# Patient Record
Sex: FEMALE | Race: WHITE | NOT HISPANIC OR LATINO | Employment: FULL TIME | URBAN - METROPOLITAN AREA
[De-identification: names, ages, dates, MRNs, and addresses within clinical notes are randomized per-mention and may not be internally consistent; named-entity substitution may affect disease eponyms.]

---

## 2018-09-12 ENCOUNTER — OFFICE VISIT (OUTPATIENT)
Dept: ENDOCRINOLOGY | Facility: HOSPITAL | Age: 43
End: 2018-09-12
Payer: COMMERCIAL

## 2018-09-12 ENCOUNTER — TELEPHONE (OUTPATIENT)
Dept: ENDOCRINOLOGY | Facility: HOSPITAL | Age: 43
End: 2018-09-12

## 2018-09-12 VITALS
HEART RATE: 128 BPM | WEIGHT: 186.2 LBS | DIASTOLIC BLOOD PRESSURE: 100 MMHG | HEIGHT: 67 IN | SYSTOLIC BLOOD PRESSURE: 160 MMHG | BODY MASS INDEX: 29.22 KG/M2

## 2018-09-12 DIAGNOSIS — E04.2 MULTIPLE THYROID NODULES: Primary | ICD-10-CM

## 2018-09-12 PROCEDURE — 99244 OFF/OP CNSLTJ NEW/EST MOD 40: CPT | Performed by: INTERNAL MEDICINE

## 2018-09-12 RX ORDER — TRIAMTERENE AND HYDROCHLOROTHIAZIDE 37.5; 25 MG/1; MG/1
1 CAPSULE ORAL DAILY
Refills: 1 | COMMUNITY
Start: 2018-08-16

## 2018-09-12 RX ORDER — CETIRIZINE HYDROCHLORIDE 10 MG/1
10 TABLET ORAL DAILY
COMMUNITY

## 2018-09-12 NOTE — LETTER
September 12, 2018     Connie Andrade, [address] [facility] [id number] [facility]    Patient: Beatris Noriega   YOB: 1975   Date of Visit: 9/12/2018       Dear Dr Ofelia Aguila:    Thank you for referring Beatris Noriega to me for evaluation  Below are my notes for this consultation  If you have questions, please do not hesitate to call me  I look forward to following your patient along with you  Sincerely,        Martina Garcia DO        CC: No Recipients  Martina Garcia DO  9/12/2018 11:28 AM  Sign at close encounter  9/12/2018    Assessment/Plan      Diagnoses and all orders for this visit:    Multiple thyroid nodules  -     US thyroid; Future    Other orders  -     triamterene-hydrochlorothiazide (DYAZIDE) 37 5-25 mg per capsule; Take 1 capsule by mouth daily  -     cetirizine (ZyrTEC) 10 mg tablet; Take 10 mg by mouth daily        Assessment/Plan:  51-year-old female presents for evaluation of thyroid nodules  There is a new nodule in the right lobe as well as a nodule in the left lobe that increased in size  Currently a still do not meet biopsy criteria  I think it would be reasonable to repeat an ultrasound at a sooner time interval   I have ordered a thyroid ultrasound for comparison to be done in 6 months and we will see her in the office to discuss the results  Follow-up in 6 months  I will also acquire thyroid function study blood work done at her physical back in July through her PCP  CC:   Thyroid nodules    History of Present Illness     HPI: Beatris Noriega is a 41y o  year old female with history of thyroid nodules for several years, hypertension, allergies who presents for consultation  She has had her thyroid nodules monitor by ultrasound about every year so  Most recent ultrasound was done in July of 2018 that shows a new nodule and nodule that increase in size  Therefore she is referred here for evaluation  She denies any family history of thyroid cancer    She states her mother has hypothyroidism  She denies any history of radiation to her head or neck area  She denies any neck compressive symptoms  Overall she feels well  Her blood pressure and pulse are elevated today but this is more related to being at a new doctor's office and anxious  She states she checks her blood pressure at the pharmacy periodically and it is better  She denies any symptoms of hypothyroidism or hyperthyroidism  Review of Systems   Constitutional: Negative for chills, fatigue and fever  HENT: Negative for trouble swallowing and voice change  Eyes: Negative for visual disturbance  Respiratory: Negative for shortness of breath  Cardiovascular: Negative for palpitations and leg swelling  Gastrointestinal: Negative for abdominal pain, nausea and vomiting  Endocrine: Negative for cold intolerance, heat intolerance, polydipsia and polyuria  Musculoskeletal: Negative for arthralgias and myalgias  Skin: Negative for rash  Neurological: Negative for dizziness, tremors and weakness  Hematological: Negative for adenopathy  Psychiatric/Behavioral: Negative for agitation and confusion  Historical Information   No past medical history on file  No past surgical history on file  Social History   History   Alcohol use Not on file     History   Drug use: Unknown     History   Smoking Status    Never Smoker   Smokeless Tobacco    Never Used     Family History:   Family History   Problem Relation Age of Onset    Hypothyroidism Mother     Heart attack Father     No Known Problems Sister        Meds/Allergies   Current Outpatient Prescriptions   Medication Sig Dispense Refill    cetirizine (ZyrTEC) 10 mg tablet Take 10 mg by mouth daily      triamterene-hydrochlorothiazide (DYAZIDE) 37 5-25 mg per capsule Take 1 capsule by mouth daily  1     No current facility-administered medications for this visit        No Known Allergies    Objective   Vitals: Blood pressure 160/100, pulse (!) 128, height 5' 7" (1 702 m), weight 84 5 kg (186 lb 3 2 oz)  Invasive Devices          No matching active lines, drains, or airways          Physical Exam   Constitutional: She is oriented to person, place, and time  She appears well-developed and well-nourished  No distress  HENT:   Head: Normocephalic and atraumatic  Eyes: Conjunctivae are normal  Pupils are equal, round, and reactive to light  Neck: Normal range of motion  Neck supple  No thyromegaly present  Cardiovascular: Normal rate and regular rhythm  Pulmonary/Chest: Effort normal and breath sounds normal  No respiratory distress  Abdominal: Soft  Bowel sounds are normal  She exhibits no distension  Musculoskeletal: Normal range of motion  She exhibits no edema  Lymphadenopathy:     She has no cervical adenopathy  Neurological: She is alert and oriented to person, place, and time  She exhibits normal muscle tone  Skin: Skin is warm and dry  No rash noted  She is not diaphoretic  Psychiatric: She has a normal mood and affect  Her behavior is normal    Vitals reviewed  The history was obtained from the review of the chart and from the patient  Lab Results:      Thyroid ultrasound from 07/05/2018 done at [facility]:  In the right lobe there is a 0 8 x 0 7 x 0 5 cm new nodule that is nearly isoechoic at the lower pole of the right lobe  In the left lobe at the lower pole there is a 0 9 x 0 9 x 0 5 cm previously 0 5 x 0 4 x 0 5 cm nodule that is nearly isoechoic with a hypoechoic rim  No future appointments

## 2018-09-12 NOTE — TELEPHONE ENCOUNTER
Spencer Bailey had thyroid blood work done through her PCP back in July  Can we acquire these for our records? Thanks

## 2018-09-12 NOTE — TELEPHONE ENCOUNTER
Called Dr Kimberli Mehta office/ spoke to Dg/ he said only thyroid labs they have were done in May/ they will fax over

## 2018-09-12 NOTE — PROGRESS NOTES
9/12/2018    Assessment/Plan      Diagnoses and all orders for this visit:    Multiple thyroid nodules  -     US thyroid; Future    Other orders  -     triamterene-hydrochlorothiazide (DYAZIDE) 37 5-25 mg per capsule; Take 1 capsule by mouth daily  -     cetirizine (ZyrTEC) 10 mg tablet; Take 10 mg by mouth daily        Assessment/Plan:  22-year-old female presents for evaluation of thyroid nodules  There is a new nodule in the right lobe as well as a nodule in the left lobe that increased in size  Currently a still do not meet biopsy criteria  I think it would be reasonable to repeat an ultrasound at a sooner time interval   I have ordered a thyroid ultrasound for comparison to be done in 6 months and we will see her in the office to discuss the results  Follow-up in 6 months  I will also acquire thyroid function study blood work done at her physical back in July through her PCP  Addendum:  Labs from [address] [place] [address] on 05/16/2018 show a TSH of 2 8  CC:   Thyroid nodules    History of Present Illness     HPI: Korin Dubose is a 41y o  year old female with history of thyroid nodules for several years, hypertension, allergies who presents for consultation  She has had her thyroid nodules monitor by ultrasound about every year so  Most recent ultrasound was done in July of 2018 that shows a new nodule and nodule that increase in size  Therefore she is referred here for evaluation  She denies any family history of thyroid cancer  She states her mother has hypothyroidism  She denies any history of radiation to her head or neck area  She denies any neck compressive symptoms  Overall she feels well  Her blood pressure and pulse are elevated today but this is more related to being at a new doctor's office and anxious  She states she checks her blood pressure at the pharmacy periodically and it is better  She denies any symptoms of hypothyroidism or hyperthyroidism      Review of Systems Constitutional: Negative for chills, fatigue and fever  HENT: Negative for trouble swallowing and voice change  Eyes: Negative for visual disturbance  Respiratory: Negative for shortness of breath  Cardiovascular: Negative for palpitations and leg swelling  Gastrointestinal: Negative for abdominal pain, nausea and vomiting  Endocrine: Negative for cold intolerance, heat intolerance, polydipsia and polyuria  Musculoskeletal: Negative for arthralgias and myalgias  Skin: Negative for rash  Neurological: Negative for dizziness, tremors and weakness  Hematological: Negative for adenopathy  Psychiatric/Behavioral: Negative for agitation and confusion  Historical Information   No past medical history on file  No past surgical history on file  Social History   History   Alcohol use Not on file     History   Drug use: Unknown     History   Smoking Status    Never Smoker   Smokeless Tobacco    Never Used     Family History:   Family History   Problem Relation Age of Onset    Hypothyroidism Mother     Heart attack Father     No Known Problems Sister        Meds/Allergies   Current Outpatient Prescriptions   Medication Sig Dispense Refill    cetirizine (ZyrTEC) 10 mg tablet Take 10 mg by mouth daily      triamterene-hydrochlorothiazide (DYAZIDE) 37 5-25 mg per capsule Take 1 capsule by mouth daily  1     No current facility-administered medications for this visit  No Known Allergies    Objective   Vitals: Blood pressure 160/100, pulse (!) 128, height 5' 7" (1 702 m), weight 84 5 kg (186 lb 3 2 oz)  Invasive Devices          No matching active lines, drains, or airways          Physical Exam   Constitutional: She is oriented to person, place, and time  She appears well-developed and well-nourished  No distress  HENT:   Head: Normocephalic and atraumatic  Eyes: Conjunctivae are normal  Pupils are equal, round, and reactive to light  Neck: Normal range of motion  Neck supple   No thyromegaly present  Cardiovascular: Normal rate and regular rhythm  Pulmonary/Chest: Effort normal and breath sounds normal  No respiratory distress  Abdominal: Soft  Bowel sounds are normal  She exhibits no distension  Musculoskeletal: Normal range of motion  She exhibits no edema  Lymphadenopathy:     She has no cervical adenopathy  Neurological: She is alert and oriented to person, place, and time  She exhibits normal muscle tone  Skin: Skin is warm and dry  No rash noted  She is not diaphoretic  Psychiatric: She has a normal mood and affect  Her behavior is normal    Vitals reviewed  The history was obtained from the review of the chart and from the patient  Lab Results:      Thyroid ultrasound from 07/05/2018 done at [facility]:  In the right lobe there is a 0 8 x 0 7 x 0 5 cm new nodule that is nearly isoechoic at the lower pole of the right lobe  In the left lobe at the lower pole there is a 0 9 x 0 9 x 0 5 cm previously 0 5 x 0 4 x 0 5 cm nodule that is nearly isoechoic with a hypoechoic rim  No future appointments

## 2019-03-21 ENCOUNTER — OFFICE VISIT (OUTPATIENT)
Dept: ENDOCRINOLOGY | Facility: HOSPITAL | Age: 44
End: 2019-03-21
Payer: COMMERCIAL

## 2019-03-21 VITALS
HEIGHT: 67 IN | SYSTOLIC BLOOD PRESSURE: 140 MMHG | WEIGHT: 186.2 LBS | HEART RATE: 108 BPM | DIASTOLIC BLOOD PRESSURE: 100 MMHG | BODY MASS INDEX: 29.22 KG/M2

## 2019-03-21 DIAGNOSIS — E04.2 MULTIPLE THYROID NODULES: Primary | ICD-10-CM

## 2019-03-21 PROCEDURE — 99214 OFFICE O/P EST MOD 30 MIN: CPT | Performed by: INTERNAL MEDICINE

## 2019-03-21 NOTE — PROGRESS NOTES
3/21/2019    Assessment/Plan      Diagnoses and all orders for this visit:    Multiple thyroid nodules  -     US thyroid; Future  -     TSH, 3rd generation Lab Collect; Future        Assessment/Plan:  82-year-old female presents for follow-up of thyroid nodules  Most recent ultrasound shows stability of left-sided nodule  I suggested I see her back in 1 year with a TSH level and thyroid ultrasound just prior  We she can call sooner with any questions or concerns  CC: Follow up    History of Present Illness     HPI: Jono Lerma is a 41y o  year old female with history of thyroid nodules for several years who presents for follow-up appointment  No history of personal head or neck radiation  No family history of thyroid cancer  TSH from May was normal   Overall she is feeling well  No symptoms of hypothyroidism or hyperthyroidism  No neck compressive symptoms  No other new health issues or symptoms of concern  Review of Systems   Constitutional: Negative for fatigue  HENT: Negative for trouble swallowing and voice change  Eyes: Negative for visual disturbance  Respiratory: Negative for shortness of breath  Cardiovascular: Negative for palpitations and leg swelling  Gastrointestinal: Negative for abdominal pain, nausea and vomiting  Endocrine: Negative for polydipsia and polyuria  Musculoskeletal: Negative for arthralgias and myalgias  Skin: Negative for rash  Neurological: Negative for dizziness, tremors and weakness  Hematological: Negative for adenopathy  Psychiatric/Behavioral: Negative for agitation and confusion  Historical Information   History reviewed  No pertinent past medical history  History reviewed  No pertinent surgical history    Social History   Social History     Substance and Sexual Activity   Alcohol Use Not on file     Social History     Substance and Sexual Activity   Drug Use Not on file     Social History     Tobacco Use   Smoking Status Never Smoker   Smokeless Tobacco Never Used     Family History:   Family History   Problem Relation Age of Onset    Hypothyroidism Mother     Heart attack Father     No Known Problems Sister        Meds/Allergies   Current Outpatient Medications   Medication Sig Dispense Refill    cetirizine (ZyrTEC) 10 mg tablet Take 10 mg by mouth daily      triamterene-hydrochlorothiazide (DYAZIDE) 37 5-25 mg per capsule Take 1 capsule by mouth daily  1     No current facility-administered medications for this visit  No Known Allergies    Objective   Vitals: Blood pressure 140/100, pulse (!) 108, height 5' 7" (1 702 m), weight 84 5 kg (186 lb 3 2 oz)  Invasive Devices          None          Physical Exam   Constitutional: She is oriented to person, place, and time  She appears well-developed and well-nourished  No distress  HENT:   Head: Normocephalic and atraumatic  Neck: Normal range of motion  Neck supple  No thyromegaly present  Cardiovascular: Normal rate and regular rhythm  Pulmonary/Chest: Effort normal and breath sounds normal    Abdominal: Soft  Bowel sounds are normal    Musculoskeletal: Normal range of motion  She exhibits no edema  Neurological: She is alert and oriented to person, place, and time  She exhibits normal muscle tone  Skin: Skin is warm and dry  No rash noted  She is not diaphoretic  Psychiatric: She has a normal mood and affect  Her behavior is normal    Vitals reviewed  The history was obtained from the review of the chart and from the patient  Lab Results:      Ultrasound of the thyroid from [address] [place] [address] on 03/12/2019: In the right lobe there is no discrete nodule  The previously suggested subtle lower pole nodule in the right lobe is not seen on current study  In the left lobe is a hypoechoic solid nodule in the lower pole measuring 1 x 0 6 x 0 9 cm previously 9 x 5 x 9 mm  No future appointments      Portions of the record may have been created with voice recognition software  Occasional wrong word or "sound a like" substitutions may have occurred due to the inherent limitations of voice recognition software  Read the chart carefully and recognize, using context, where substitutions have occurred

## 2020-03-14 LAB — TSH SERPL-ACNC: 2.77 MIU/L

## 2020-03-17 ENCOUNTER — TELEPHONE (OUTPATIENT)
Dept: ENDOCRINOLOGY | Facility: HOSPITAL | Age: 45
End: 2020-03-17

## 2020-03-17 NOTE — TELEPHONE ENCOUNTER
She did the blood and results are in Epic  Please advise  Does she still need labs in a couple of months when she reschedules?

## 2020-03-17 NOTE — TELEPHONE ENCOUNTER
Pt has an appointment for a yearly f/u with you on 3/23  With the [address] going on pt wants to know if she has to come in and if you're ok with her rescheduling?

## 2020-03-17 NOTE — TELEPHONE ENCOUNTER
She can reschedule  She can have her TSH blood test and thyroid ultrasound done closer to when she reschedules such as in 3-4 months  I may need to reorder these

## 2020-05-18 ENCOUNTER — TELEMEDICINE (OUTPATIENT)
Dept: ENDOCRINOLOGY | Facility: HOSPITAL | Age: 45
End: 2020-05-18
Payer: COMMERCIAL

## 2020-05-18 DIAGNOSIS — E04.2 MULTIPLE THYROID NODULES: Primary | ICD-10-CM

## 2020-05-18 PROCEDURE — 99242 OFF/OP CONSLTJ NEW/EST SF 20: CPT | Performed by: INTERNAL MEDICINE

## 2020-09-25 ENCOUNTER — OFFICE VISIT (OUTPATIENT)
Dept: ENDOCRINOLOGY | Facility: HOSPITAL | Age: 45
End: 2020-09-25
Payer: COMMERCIAL

## 2020-09-25 VITALS
DIASTOLIC BLOOD PRESSURE: 92 MMHG | HEART RATE: 112 BPM | TEMPERATURE: 97.8 F | BODY MASS INDEX: 31.21 KG/M2 | HEIGHT: 66 IN | WEIGHT: 194.2 LBS | SYSTOLIC BLOOD PRESSURE: 158 MMHG

## 2020-09-25 DIAGNOSIS — E04.1 THYROID NODULE: Primary | ICD-10-CM

## 2020-09-25 PROCEDURE — 99213 OFFICE O/P EST LOW 20 MIN: CPT | Performed by: INTERNAL MEDICINE

## 2020-09-25 NOTE — PROGRESS NOTES
9/25/2020    Assessment/Plan      Diagnoses and all orders for this visit:    Thyroid nodule  -     TSH, 3rd generation Lab Collect; Future  -     US thyroid; Future        Assessment/Plan:  Thyroid nodule:  TSH from March was normal   The left-sided thyroid nodule has gradually increased in size though it is still on the small side and appears low risk based on its isoechoic description  Discussed with the patient that we can monitor closely and I would repeat an ultrasound in 6 months to monitor closely for any additional growth  At that time she will be due for another TSH level  We will see her in the office at that time and reviewed the blood work and ultrasound  CC:  Follow-up    History of Present Illness     HPI: Monisha Lea is a 39y o  year old female with history of thyroid nodules for several years who presents for a follow-up appointment  No family history of thyroid cancer and no personal history of head or neck radiation  No neck compressive symptoms  She presents today overall feeling well  No new health issues or symptoms of concern  No neck compressive symptoms  Review of Systems   Constitutional: Negative for fatigue  HENT: Negative for trouble swallowing and voice change  Eyes: Negative for visual disturbance  Respiratory: Negative for shortness of breath  Cardiovascular: Negative for palpitations and leg swelling  Gastrointestinal: Negative for abdominal pain, nausea and vomiting  Endocrine: Negative for polydipsia and polyuria  Musculoskeletal: Negative for arthralgias and myalgias  Skin: Negative for rash  Neurological: Negative for dizziness, tremors and weakness  Hematological: Negative for adenopathy  Psychiatric/Behavioral: Negative for agitation and confusion  Historical Information   History reviewed  No pertinent past medical history  History reviewed  No pertinent surgical history    Social History   Social History     Substance and Sexual Activity   Alcohol Use None     Social History     Substance and Sexual Activity   Drug Use Not on file     Social History     Tobacco Use   Smoking Status Never Smoker   Smokeless Tobacco Never Used     Family History:   Family History   Problem Relation Age of Onset    Hypothyroidism Mother     Heart attack Father     No Known Problems Sister        Meds/Allergies   Current Outpatient Medications   Medication Sig Dispense Refill    cetirizine (ZyrTEC) 10 mg tablet Take 10 mg by mouth daily      triamterene-hydrochlorothiazide (DYAZIDE) 37 5-25 mg per capsule Take 1 capsule by mouth daily  1     No current facility-administered medications for this visit  No Known Allergies    Objective   Vitals: Blood pressure 158/92, pulse (!) 112, temperature 97 8 °F (36 6 °C), height 5' 6" (1 676 m), weight 88 1 kg (194 lb 3 2 oz)  Invasive Devices     None                 Physical Exam  Vitals signs reviewed  Constitutional:       General: She is not in acute distress  Appearance: She is well-developed  She is not diaphoretic  HENT:      Head: Normocephalic and atraumatic  Eyes:      Conjunctiva/sclera: Conjunctivae normal       Pupils: Pupils are equal, round, and reactive to light  Neck:      Musculoskeletal: Normal range of motion and neck supple  Thyroid: No thyromegaly  Cardiovascular:      Rate and Rhythm: Normal rate and regular rhythm  Pulmonary:      Effort: Pulmonary effort is normal  No respiratory distress  Breath sounds: Normal breath sounds  Abdominal:      General: Bowel sounds are normal       Palpations: Abdomen is soft  Musculoskeletal: Normal range of motion  Skin:     General: Skin is warm and dry  Findings: No rash  Neurological:      Mental Status: She is alert and oriented to person, place, and time  Motor: No abnormal muscle tone     Psychiatric:         Behavior: Behavior normal          The history was obtained from the review of the chart and from the patient  Lab Results:      Recent Results (from the past 18832 hour(s))   TSH, 3rd generation    Collection Time: 03/13/20  8:37 AM   Result Value Ref Range    TSH 2 77 mIU/L     Ultrasound of the thyroid from [facility] on 09/14/2020: The left lobe of the thyroid gland has a left lower pole nodule isoechoic measuring 12 x 8 x 11 mm previously 10 x 6 x 9 mm and in 2018 measured 9 x 9 x 5 mm and in 2016 measured 5 x 4 x 5 mm  No future appointments  Portions of the record may have been created with voice recognition software  Occasional wrong word or "sound a like" substitutions may have occurred due to the inherent limitations of voice recognition software  Read the chart carefully and recognize, using context, where substitutions have occurred

## 2021-03-29 ENCOUNTER — OFFICE VISIT (OUTPATIENT)
Dept: ENDOCRINOLOGY | Facility: HOSPITAL | Age: 46
End: 2021-03-29
Payer: COMMERCIAL

## 2021-03-29 VITALS
DIASTOLIC BLOOD PRESSURE: 88 MMHG | BODY MASS INDEX: 30.92 KG/M2 | HEART RATE: 104 BPM | SYSTOLIC BLOOD PRESSURE: 136 MMHG | HEIGHT: 66 IN | WEIGHT: 192.4 LBS

## 2021-03-29 DIAGNOSIS — E04.2 MULTIPLE THYROID NODULES: Primary | ICD-10-CM

## 2021-03-29 PROBLEM — I10 HYPERTENSION: Status: ACTIVE | Noted: 2021-03-29

## 2021-03-29 PROCEDURE — 99214 OFFICE O/P EST MOD 30 MIN: CPT | Performed by: INTERNAL MEDICINE

## 2021-03-29 NOTE — PROGRESS NOTES
3/29/2021    Assessment/Plan      Diagnoses and all orders for this visit:    Multiple thyroid nodules  -     US guided thyroid biopsy with AFIRMA; Future        Assessment/Plan:    Thyroid nodules:  Recent TSH is normal   The left lower lobe thyroid nodule over the years has gradually increased in size  It technically does not meet criteria though given the progressive growth, I suggested we pursue fine-needle aspiration biopsy and I have requested sample be saved for genetic testing in case of [place] class 3 or 4 result  We will be in touch with the patient as results are available  CC: Follow-up    History of Present Illness     HPI: Sherly Magaña is a 42y o  year old female with history of Thyroid nodules for several years who presents for a follow-up appointment  No family history of thyroid cancer no personal history of head or neck radiation  No neck compressive symptoms  She presents today overall feeling well without any new health issues or symptoms of concern  Review of Systems   Constitutional: Negative for fatigue  HENT: Negative for trouble swallowing and voice change  Eyes: Negative for visual disturbance  Respiratory: Negative for shortness of breath  Cardiovascular: Negative for palpitations and leg swelling  Gastrointestinal: Negative for abdominal pain, nausea and vomiting  Endocrine: Negative for polydipsia and polyuria  Musculoskeletal: Negative for arthralgias and myalgias  Skin: Negative for rash  Neurological: Negative for dizziness, tremors and weakness  Hematological: Negative for adenopathy  Psychiatric/Behavioral: Negative for agitation and confusion  Historical Information   History reviewed  No pertinent past medical history  History reviewed  No pertinent surgical history    Social History   Social History     Substance and Sexual Activity   Alcohol Use None     Social History     Substance and Sexual Activity   Drug Use Not on file Social History     Tobacco Use   Smoking Status Never Smoker   Smokeless Tobacco Never Used     Family History:   Family History   Problem Relation Age of Onset    Hypothyroidism Mother     Heart attack Father     No Known Problems Sister        Meds/Allergies   Current Outpatient Medications   Medication Sig Dispense Refill    cetirizine (ZyrTEC) 10 mg tablet Take 10 mg by mouth daily      triamterene-hydrochlorothiazide (DYAZIDE) 37 5-25 mg per capsule Take 1 capsule by mouth daily  1     No current facility-administered medications for this visit  No Known Allergies    Objective   Vitals: Blood pressure 136/88, pulse 104, height 5' 6" (1 676 m), weight 87 3 kg (192 lb 6 4 oz)  Invasive Devices     None                 Physical Exam  Vitals signs reviewed  Constitutional:       General: She is not in acute distress  Appearance: She is well-developed  She is not diaphoretic  HENT:      Head: Normocephalic and atraumatic  Eyes:      Conjunctiva/sclera: Conjunctivae normal       Pupils: Pupils are equal, round, and reactive to light  Neck:      Musculoskeletal: Normal range of motion and neck supple  Thyroid: No thyromegaly  Cardiovascular:      Rate and Rhythm: Normal rate and regular rhythm  Pulmonary:      Effort: Pulmonary effort is normal  No respiratory distress  Breath sounds: Normal breath sounds  Abdominal:      General: Bowel sounds are normal       Palpations: Abdomen is soft  Musculoskeletal: Normal range of motion  Skin:     General: Skin is warm and dry  Findings: No rash  Neurological:      Mental Status: She is alert and oriented to person, place, and time  Motor: No abnormal muscle tone  Psychiatric:         Behavior: Behavior normal          The history was obtained from the review of the chart and from the patient      Lab Results:      Recent Results (from the past 32834 hour(s))   TSH, 3rd generation    Collection Time: 03/13/20  8:37 AM Result Value Ref Range    TSH 2 77 mIU/L       Ultrasound of the thyroid from 03/17/2021 done at [facility]:   Left lower pole nodule measuring 1 3 x 1 2 x 1 2 cm previously 1 2 x 0 8 x 1 1 cm than prior to that 1 x 0 6 x 0 9 cm  TSH 3/18/21 3 21    No future appointments  Portions of the record may have been created with voice recognition software  Occasional wrong word or "sound a like" substitutions may have occurred due to the inherent limitations of voice recognition software  Read the chart carefully and recognize, using context, where substitutions have occurred

## 2021-04-08 ENCOUNTER — TELEPHONE (OUTPATIENT)
Dept: ENDOCRINOLOGY | Facility: HOSPITAL | Age: 46
End: 2021-04-08

## 2021-04-08 DIAGNOSIS — E04.2 MULTIPLE THYROID NODULES: Primary | ICD-10-CM

## 2021-04-08 NOTE — TELEPHONE ENCOUNTER
Recent biopsy done on 04/06/2021 at [facility] shows left-sided thyroid nodule was negative for malignancy  For now we will continue to monitor and suggest repeating an ultrasound prior to appointment in October

## 2021-10-04 ENCOUNTER — OFFICE VISIT (OUTPATIENT)
Dept: ENDOCRINOLOGY | Facility: HOSPITAL | Age: 46
End: 2021-10-04
Payer: COMMERCIAL

## 2021-10-04 VITALS
HEART RATE: 89 BPM | BODY MASS INDEX: 31.08 KG/M2 | SYSTOLIC BLOOD PRESSURE: 120 MMHG | WEIGHT: 193.4 LBS | DIASTOLIC BLOOD PRESSURE: 84 MMHG | HEIGHT: 66 IN

## 2021-10-04 DIAGNOSIS — E04.2 MULTIPLE THYROID NODULES: Primary | ICD-10-CM

## 2021-10-04 PROCEDURE — 99213 OFFICE O/P EST LOW 20 MIN: CPT | Performed by: INTERNAL MEDICINE

## 2021-10-04 RX ORDER — LOSARTAN POTASSIUM 100 MG/1
100 TABLET ORAL
COMMUNITY
Start: 2021-07-22

## 2021-10-04 RX ORDER — ESCITALOPRAM OXALATE 10 MG/1
10 TABLET ORAL DAILY
COMMUNITY
Start: 2021-09-15

## 2022-09-28 LAB — TSH SERPL-ACNC: 2.62 MIU/L

## 2022-09-29 ENCOUNTER — OFFICE VISIT (OUTPATIENT)
Dept: ENDOCRINOLOGY | Facility: CLINIC | Age: 47
End: 2022-09-29
Payer: COMMERCIAL

## 2022-09-29 VITALS
WEIGHT: 206 LBS | HEART RATE: 99 BPM | HEIGHT: 66 IN | SYSTOLIC BLOOD PRESSURE: 126 MMHG | DIASTOLIC BLOOD PRESSURE: 74 MMHG | BODY MASS INDEX: 33.11 KG/M2

## 2022-09-29 DIAGNOSIS — E04.2 MULTIPLE THYROID NODULES: Primary | ICD-10-CM

## 2022-09-29 PROCEDURE — 99213 OFFICE O/P EST LOW 20 MIN: CPT | Performed by: NURSE PRACTITIONER

## 2022-09-29 RX ORDER — ESCITALOPRAM OXALATE 10 MG/1
1 TABLET ORAL DAILY
COMMUNITY
End: 2022-09-29 | Stop reason: SDUPTHER

## 2022-09-29 RX ORDER — CETIRIZINE HYDROCHLORIDE 10 MG/1
CAPSULE, LIQUID FILLED ORAL EVERY 24 HOURS
COMMUNITY

## 2022-09-29 NOTE — ASSESSMENT & PLAN NOTE
Thyroid nodule stable on ultrasound with normal thyroid function test 2 62 from 9/27/2022  Will completed follow up thyroid function tests and thyroid ultrasound with follow up in office in one year

## 2022-09-29 NOTE — PROGRESS NOTES
Established Patient Progress Note       No chief complaint on file  History of Present Illness:     Shekhar Moran is a 41 y o  female with a history of thyroid nodules for past several years with no personal history of neck or head radiation  Denies personal or family history of thyroid cancer  The thyroid nodule in left lower lobe was biopsied in April 2021, benign, due to increasing growth  Follows up today after stable thyroid ultrasound from 9/27/2022  Continues to deny compressive symptoms  She continues to feel overall well  Patient Active Problem List   Diagnosis    Multiple thyroid nodules    Hypertension      History reviewed  No pertinent past medical history  Past Surgical History:   Procedure Laterality Date    US GUIDED THYROID BIOPSY  4/8/2021      Family History   Problem Relation Age of Onset    Hypothyroidism Mother     Heart attack Father     No Known Problems Sister      Social History     Tobacco Use    Smoking status: Never Smoker    Smokeless tobacco: Never Used   Substance Use Topics    Alcohol use: Yes     Comment: socially     No Known Allergies    Current Outpatient Medications:     Cetirizine HCl (ZyrTEC Allergy) 10 MG CAPS, every 24 hours, Disp: , Rfl:     escitalopram (LEXAPRO) 10 mg tablet, Take 10 mg by mouth daily, Disp: , Rfl:     losartan (COZAAR) 100 MG tablet, Take 100 mg by mouth daily at bedtime, Disp: , Rfl:     triamterene-hydrochlorothiazide (DYAZIDE) 37 5-25 mg per capsule, Take 1 capsule by mouth daily, Disp: , Rfl: 1    Review of Systems   Constitutional: Negative for activity change, appetite change, fatigue and unexpected weight change  HENT: Negative for sore throat, trouble swallowing and voice change  Eyes: Negative for visual disturbance  Respiratory: Negative for cough and shortness of breath  Cardiovascular: Negative for chest pain and palpitations     Gastrointestinal: Negative for abdominal distention, abdominal pain, constipation, diarrhea, nausea and vomiting  Endocrine: Negative for cold intolerance and heat intolerance  Skin: Negative for color change, pallor and rash  Neurological: Negative for dizziness, weakness, light-headedness and headaches  Physical Exam:  Body mass index is 33 25 kg/m²  /74   Pulse 99   Ht 5' 6" (1 676 m)   Wt 93 4 kg (206 lb)   BMI 33 25 kg/m²    Wt Readings from Last 3 Encounters:   09/29/22 93 4 kg (206 lb)   10/04/21 87 7 kg (193 lb 6 4 oz)   03/29/21 87 3 kg (192 lb 6 4 oz)       Physical Exam  Vitals reviewed  Constitutional:       Appearance: Normal appearance  Eyes:      Conjunctiva/sclera: Conjunctivae normal    Neck:      Comments: No thyromegaly  Cardiovascular:      Rate and Rhythm: Normal rate and regular rhythm  Pulses: Normal pulses  Heart sounds: Normal heart sounds  Pulmonary:      Effort: Pulmonary effort is normal       Breath sounds: Normal breath sounds  Abdominal:      General: Abdomen is flat  Musculoskeletal:         General: Normal range of motion  Cervical back: Normal range of motion and neck supple  Skin:     General: Skin is warm and dry  Capillary Refill: Capillary refill takes less than 2 seconds  Neurological:      General: No focal deficit present  Mental Status: She is alert and oriented to person, place, and time  Psychiatric:         Mood and Affect: Mood normal          Behavior: Behavior normal          Labs:     Component      Latest Ref Rng & Units 9/27/2022   TSH, POC      mIU/L 2 62     Imaging 9/29/2022:   1) Grossly stable 1 6 cm TI-RADS 4 nodule in the left lobe of thyroid, biopsied April 2021  2) No new thyroid nodules    Impression & Plan:    Problem List Items Addressed This Visit        Endocrine    Multiple thyroid nodules - Primary     Thyroid nodule stable on ultrasound with normal thyroid function test 2 62 from 9/27/2022    Will completed follow up thyroid function tests and thyroid ultrasound with follow up in office in one year  Relevant Orders    US thyroid    TSH, 3rd generation with Free T4 reflex          Orders Placed This Encounter   Procedures    US thyroid     Standing Status:   Future     Standing Expiration Date:   3/29/2024     Scheduling Instructions:      No prep required  Please bring your physician order, insurance cards, a form of photo ID and a list of your medications with you  Arrive 15 minutes prior to your appointment time in order to register  To schedule this appointment, please contact central scheduling at [phone]     Order Specific Question:   Reason for Exam:     Answer:   Thyroid disorder     Order Specific Question:   Is the patient pregnant? Answer:   No    TSH, 3rd generation with Free T4 reflex     Standing Status:   Future     Standing Expiration Date:   3/29/2024         Discussed with the patient and all questioned fully answered  She will call me if any problems arise        Follow-up appointment in 1 year     Counseled patient on diagnostic results, prognosis, risk and benefit of treatment options, instruction for management, importance of treatment compliance, Risk  factor reduction and impressions      ISHAAN Soni

## 2023-10-12 LAB — TSH SERPL-ACNC: 2.48 MIU/L

## 2023-10-19 ENCOUNTER — OFFICE VISIT (OUTPATIENT)
Dept: ENDOCRINOLOGY | Facility: CLINIC | Age: 48
End: 2023-10-19
Payer: COMMERCIAL

## 2023-10-19 VITALS
WEIGHT: 216.6 LBS | DIASTOLIC BLOOD PRESSURE: 90 MMHG | HEART RATE: 96 BPM | BODY MASS INDEX: 34.81 KG/M2 | SYSTOLIC BLOOD PRESSURE: 158 MMHG | HEIGHT: 66 IN

## 2023-10-19 DIAGNOSIS — E04.2 MULTIPLE THYROID NODULES: Primary | ICD-10-CM

## 2023-10-19 PROCEDURE — 99214 OFFICE O/P EST MOD 30 MIN: CPT | Performed by: INTERNAL MEDICINE

## 2023-10-19 NOTE — PROGRESS NOTES
10/19/2023    Assessment/Plan      Diagnoses and all orders for this visit:    Multiple thyroid nodules  -     TSH, 3rd generation Lab Collect; Future  -     US thyroid; Future        Assessment/Plan:  Thyroid nodule: TSH is normal.  Thyroid ultrasound overall appears stable and does not meet criteria. Significant change in size out of the realm of technical changes. We will continue to monitor annually. She will keep us with any changes such as new neck compressive symptoms. CC: Follow-up    History of Present Illness     HPI: Farhana Obregon is a 57y.o. year old female with history of thyroid nodules for several years who presents for a follow-up appointment. No known family history of thyroid cancer. No personal history of neck or head radiation. No neck compressive symptoms. Patient underwent biopsy of left lower lobe thyroid nodule in the past which came back benign in April 2021. She presents today overall feeling well. No new health issues or symptoms of concern. Review of Systems   Constitutional:  Negative for fatigue. HENT:  Negative for trouble swallowing and voice change. Eyes:  Negative for visual disturbance. Respiratory:  Negative for shortness of breath. Cardiovascular:  Negative for palpitations and leg swelling. Gastrointestinal:  Negative for abdominal pain, nausea and vomiting. Endocrine: Negative for polydipsia and polyuria. Musculoskeletal:  Negative for arthralgias and myalgias. Skin:  Negative for rash. Neurological:  Negative for dizziness, tremors and weakness. Hematological:  Negative for adenopathy. Psychiatric/Behavioral:  Negative for agitation and confusion. Historical Information   History reviewed. No pertinent past medical history.   Past Surgical History:   Procedure Laterality Date    US GUIDED THYROID BIOPSY  4/8/2021     Social History   Social History     Substance and Sexual Activity   Alcohol Use Yes    Comment: socially     Social History     Substance and Sexual Activity   Drug Use Not on file     Social History     Tobacco Use   Smoking Status Never   Smokeless Tobacco Never     Family History:   Family History   Problem Relation Age of Onset    Hypothyroidism Mother     Heart attack Father     No Known Problems Sister        Meds/Allergies   Current Outpatient Medications   Medication Sig Dispense Refill    Cetirizine HCl (ZyrTEC Allergy) 10 MG CAPS every 24 hours      escitalopram (LEXAPRO) 10 mg tablet Take 10 mg by mouth daily      losartan (COZAAR) 100 MG tablet Take 100 mg by mouth daily at bedtime      triamterene-hydrochlorothiazide (DYAZIDE) 37.5-25 mg per capsule Take 1 capsule by mouth daily  1     No current facility-administered medications for this visit. No Known Allergies    Objective   Vitals: Blood pressure 158/90, pulse 96, height 5' 6" (1.676 m), weight 98.2 kg (216 lb 9.6 oz). Invasive Devices       None                   Physical Exam  Vitals reviewed. Constitutional:       General: She is not in acute distress. Appearance: She is well-developed. She is not diaphoretic. HENT:      Head: Normocephalic and atraumatic. Eyes:      Conjunctiva/sclera: Conjunctivae normal.      Pupils: Pupils are equal, round, and reactive to light. Neck:      Thyroid: No thyromegaly. Cardiovascular:      Rate and Rhythm: Normal rate and regular rhythm. Pulmonary:      Effort: Pulmonary effort is normal. No respiratory distress. Breath sounds: Normal breath sounds. Abdominal:      General: Bowel sounds are normal.      Palpations: Abdomen is soft. Musculoskeletal:         General: Normal range of motion. Cervical back: Normal range of motion and neck supple. Skin:     General: Skin is warm and dry. Findings: No rash. Neurological:      Mental Status: She is alert and oriented to person, place, and time. Motor: No abnormal muscle tone.    Psychiatric:         Behavior: Behavior normal. The history was obtained from the review of the chart and from the patient. Lab Results:      Recent Results (from the past 23659 hour(s))   TSH, 3rd generation    Collection Time: 09/27/22 12:06 PM   Result Value Ref Range    TSH 2.62 mIU/L   TSH, 3rd generation with Free T4 reflex    Collection Time: 10/12/23  9:23 AM   Result Value Ref Range    TSH W/RFX TO FREE T4 2.48 mIU/L     Recent thyroid ultrasound shows left lower lobe thyroid nodule measures 1.8 x 1.5 x 1.6 cm with reported no change in growth or size. No future appointments. Portions of the record may have been created with voice recognition software. Occasional wrong word or "sound a like" substitutions may have occurred due to the inherent limitations of voice recognition software. Read the chart carefully and recognize, using context, where substitutions have occurred.